# Patient Record
Sex: FEMALE | Race: WHITE | NOT HISPANIC OR LATINO | Employment: FULL TIME | URBAN - METROPOLITAN AREA
[De-identification: names, ages, dates, MRNs, and addresses within clinical notes are randomized per-mention and may not be internally consistent; named-entity substitution may affect disease eponyms.]

---

## 2018-06-21 ENCOUNTER — TRANSCRIBE ORDERS (OUTPATIENT)
Dept: ADMINISTRATIVE | Facility: HOSPITAL | Age: 48
End: 2018-06-21

## 2018-06-21 DIAGNOSIS — R94.31 ABNORMAL RESTING ECG FINDINGS: Primary | ICD-10-CM

## 2018-06-22 ENCOUNTER — HOSPITAL ENCOUNTER (OUTPATIENT)
Dept: CARDIOLOGY | Facility: HOSPITAL | Age: 48
Discharge: HOME | End: 2018-06-22
Attending: FAMILY MEDICINE | Admitting: FAMILY MEDICINE

## 2018-06-22 VITALS — WEIGHT: 145 LBS | HEIGHT: 68 IN | BODY MASS INDEX: 21.98 KG/M2

## 2018-06-22 DIAGNOSIS — R94.31 ABNORMAL RESTING ECG FINDINGS: ICD-10-CM

## 2018-06-22 LAB
BH CV ECHO MEAS - AO MAX PG (FULL): -0.35 MMHG
BH CV ECHO MEAS - AO MAX PG: 8 MMHG
BH CV ECHO MEAS - AO MEAN PG (FULL): 0.27 MMHG
BH CV ECHO MEAS - AO MEAN PG: 4.1 MMHG
BH CV ECHO MEAS - AO V2 MAX: 137.7 CM/SEC
BH CV ECHO MEAS - AO V2 MEAN: 93.7 CM/SEC
BH CV ECHO MEAS - AO V2 VTI: 29 CM
BH CV ECHO MEAS - AVA(I,A): 3 CM^2
BH CV ECHO MEAS - AVA(I,D): 3 CM^2
BH CV ECHO MEAS - AVA(V,A): 3.2 CM^2
BH CV ECHO MEAS - AVA(V,D): 3.2 CM^2
BH CV ECHO MEAS - BSA(HAYCOCK): 1.8 M^2
BH CV ECHO MEAS - BSA: 1.8 M^2
BH CV ECHO MEAS - BZI_BMI: 22 KILOGRAMS/M^2
BH CV ECHO MEAS - BZI_METRIC_HEIGHT: 172.7 CM
BH CV ECHO MEAS - BZI_METRIC_WEIGHT: 65.8 KG
BH CV ECHO MEAS - CONTRAST EF (2CH): 70.6 ML/M^2
BH CV ECHO MEAS - CONTRAST EF 4CH: 57.2 ML/M^2
BH CV ECHO MEAS - EDV(CUBED): 29.3 ML
BH CV ECHO MEAS - EDV(MOD-SP2): 98.8 ML
BH CV ECHO MEAS - EDV(MOD-SP4): 96.9 ML
BH CV ECHO MEAS - EF(MOD-BP): 62.4 %
BH CV ECHO MEAS - EF(MOD-SP2): 70.6 %
BH CV ECHO MEAS - EF(MOD-SP4): 57.2 %
BH CV ECHO MEAS - ESV(CUBED): 120.6 ML
BH CV ECHO MEAS - ESV(MOD-SP2): 29 ML
BH CV ECHO MEAS - ESV(MOD-SP4): 41.5 ML
BH CV ECHO MEAS - FS: 45 %
BH CV ECHO MEAS - IVSD: 0.8 CM
BH CV ECHO MEAS - LAT PEAK E' VEL: 23.1 CM/SEC
BH CV ECHO MEAS - LV DIASTOLIC VOL/BSA (35-75): 54.4 ML/M^2
BH CV ECHO MEAS - LV MASS(C)D: 137.3 GRAMS
BH CV ECHO MEAS - LV MAX PG: 8.3 MMHG
BH CV ECHO MEAS - LV MEAN PG: 3.8 MMHG
BH CV ECHO MEAS - LV SYSTOLIC VOL/BSA (12-30): 23.3 ML/M^2
BH CV ECHO MEAS - LV V1 MAX: 144.4 CM/SEC
BH CV ECHO MEAS - LV V1 MEAN: 87.9 CM/SEC
BH CV ECHO MEAS - LV V1 VTI: 27.8 CM
BH CV ECHO MEAS - LVIDD: 5 CM
BH CV ECHO MEAS - LVIDS: 2.8 CM
BH CV ECHO MEAS - LVLD AP2: 7 CM
BH CV ECHO MEAS - LVLD AP4: 7 CM
BH CV ECHO MEAS - LVLS AP2: 5 CM
BH CV ECHO MEAS - LVLS AP4: 5.6 CM
BH CV ECHO MEAS - LVOT AREA (M): 3.1 CM^2
BH CV ECHO MEAS - LVOT AREA: 3.1 CM^2
BH CV ECHO MEAS - LVOT DIAM: 2 CM
BH CV ECHO MEAS - LVPWD: 0.8 CM
BH CV ECHO MEAS - MED PEAK E' VEL: 13.3 CM/SEC
BH CV ECHO MEAS - MV A MAX VEL: 47.6 CM/SEC
BH CV ECHO MEAS - MV DEC TIME: 0.29 SEC
BH CV ECHO MEAS - MV E MAX VEL: 74.9 CM/SEC
BH CV ECHO MEAS - MV E/A: 1.6
BH CV ECHO MEAS - MV MAX PG: 2.8 MMHG
BH CV ECHO MEAS - MV MEAN PG: 0.95 MMHG
BH CV ECHO MEAS - MV V2 MAX: 83.3 CM/SEC
BH CV ECHO MEAS - MV V2 MEAN: 44.7 CM/SEC
BH CV ECHO MEAS - MV V2 VTI: 28.5 CM
BH CV ECHO MEAS - MVA(VTI): 3 CM^2
BH CV ECHO MEAS - PA ACC TIME: 0.23 SEC
BH CV ECHO MEAS - PA MAX PG: 4.8 MMHG
BH CV ECHO MEAS - PA PR(ACCEL): -25.5 MMHG
BH CV ECHO MEAS - PA V2 MAX: 109.7 CM/SEC
BH CV ECHO MEAS - PI END-D VEL: 97.3 CM/SEC
BH CV ECHO MEAS - PULM A REVS VEL: 28.5 CM/SEC
BH CV ECHO MEAS - PULM DIAS VEL: 68.7 CM/SEC
BH CV ECHO MEAS - PULM S/D: 1
BH CV ECHO MEAS - PULM SYS VEL: 70.7 CM/SEC
BH CV ECHO MEAS - RAP SYSTOLE: 3 MMHG
BH CV ECHO MEAS - RVSP: 24 MMHG
BH CV ECHO MEAS - SI(LVOT): 48.3 ML/M^2
BH CV ECHO MEAS - SI(MOD-SP2): 39.2 ML/M^2
BH CV ECHO MEAS - SI(MOD-SP4): 31.1 ML/M^2
BH CV ECHO MEAS - SV(LVOT): 86.1 ML
BH CV ECHO MEAS - SV(MOD-SP2): 69.8 ML
BH CV ECHO MEAS - SV(MOD-SP4): 55.4 ML
BH CV ECHO MEAS - TAPSE (>1.6): 2.68 CM2
BH CV ECHO MEAS - TR MAX VEL: 228.7 CM/SEC
BH CV ECHO MEASUREMENTS AVERAGE E/E' RATIO: 4.12
BH CV VAS BP LEFT ARM: NORMAL MMHG
BH CV XLRA - RV BASE: 3.7 CM
BH CV XLRA - RV LENGTH: 6.2 CM
BH CV XLRA - RV MID: 3.2 CM
BH CV XLRA - TDI S': 14.2 CM/SEC
LEFT ATRIUM VOLUME INDEX: 25.3 ML/M2
LV EF 2D ECHO EST: 60 %

## 2018-06-22 PROCEDURE — 93306 TTE W/DOPPLER COMPLETE: CPT

## 2018-06-22 PROCEDURE — 93306 TTE W/DOPPLER COMPLETE: CPT | Performed by: INTERNAL MEDICINE

## 2021-05-31 PROBLEM — Z80.3 FAMILY HISTORY OF BREAST CANCER: Status: ACTIVE | Noted: 2021-05-31

## 2021-05-31 PROBLEM — N60.19 FIBROCYSTIC BREAST DISEASE (FCBD): Status: ACTIVE | Noted: 2021-05-31

## 2021-06-01 ENCOUNTER — OFFICE VISIT (OUTPATIENT)
Dept: OBSTETRICS AND GYNECOLOGY | Facility: CLINIC | Age: 51
End: 2021-06-01

## 2021-06-01 VITALS
HEIGHT: 69 IN | BODY MASS INDEX: 22.36 KG/M2 | SYSTOLIC BLOOD PRESSURE: 126 MMHG | DIASTOLIC BLOOD PRESSURE: 80 MMHG | WEIGHT: 151 LBS

## 2021-06-01 DIAGNOSIS — Z01.419 WOMEN'S ANNUAL ROUTINE GYNECOLOGICAL EXAMINATION: Primary | ICD-10-CM

## 2021-06-01 DIAGNOSIS — Z80.3 FAMILY HISTORY OF BREAST CANCER: ICD-10-CM

## 2021-06-01 DIAGNOSIS — N60.19 FIBROCYSTIC BREAST DISEASE (FCBD), UNSPECIFIED LATERALITY: ICD-10-CM

## 2021-06-01 DIAGNOSIS — Z12.31 BREAST CANCER SCREENING BY MAMMOGRAM: ICD-10-CM

## 2021-06-01 PROCEDURE — 99396 PREV VISIT EST AGE 40-64: CPT | Performed by: OBSTETRICS & GYNECOLOGY

## 2021-06-01 RX ORDER — LORATADINE 10 MG/1
10 TABLET ORAL DAILY
COMMUNITY

## 2021-06-01 RX ORDER — MULTIPLE VITAMINS W/ MINERALS TAB 9MG-400MCG
1 TAB ORAL DAILY
COMMUNITY

## 2021-06-01 NOTE — PROGRESS NOTES
GYN Annual Exam     CC - Here for annual exam.        HPI  Merissa Banks is a 50 y.o. female, , who presents for annual well woman exam.  She is perimenopausal.  Periods are irregular, she reports common for her to skip months.  Dysmenorrhea:moderate, occurring first 1-2 days of flow.  Patient reports problems with: dyspareunia. That occurs with initial and deep pentration. Reports it feels like something is scratching the side. Also reports intermittent night sweats. States it is currently really bothersome. There were no changes to her medical or surgical history since her last visit.. Partner Status: Marital Status: .  New Partners since last visit: no.      Additional OB/GYN History   Current contraception: contraceptive methods: Vasectomy   Desires to: do not start contraception  On HRT? No  Last Pap :   Last Completed Pap Smear     This patient has no relevant Health Maintenance data.        History of abnormal Pap smear: yes - many years ago  Family history of uterine, colon, breast, or ovarian cancer: yes - mother and maternal grandmother had breast cancer.  Performs monthly Self-Breast Exam: no  Last mammogram:   Last Completed Mammogram          Ordered - MAMMOGRAM (Every 2 Years) Ordered on 2020  Done - benign @ [facility]              Last colonoscopy: summer of last year with [facility]  Last Completed Colonoscopy     This patient has no relevant Health Maintenance data.        Last DEXA: None  Exercises Regularly: yes  Feelings of Anxiety or Depression: no      Tobacco Usage?: No   OB History        0    Para   0    Term   0       0    AB   0    Living   0       SAB   0    TAB   0    Ectopic   0    Molar   0    Multiple   0    Live Births   0                Health Maintenance   Topic Date Due   • Annual Gynecologic Pelvic and Breast Exam  Never done   • ANNUAL PHYSICAL  Never done   • ZOSTER VACCINE (1 of 2) Never done   • HEPATITIS C SCREENING  Never done   •  "INFLUENZA VACCINE  08/01/2021   • MAMMOGRAM  08/04/2022   • TDAP/TD VACCINES (3 - Td or Tdap) 06/11/2030   • COLORECTAL CANCER SCREENING  08/01/2030   • COVID-19 Vaccine  Completed   • Pneumococcal Vaccine 0-64  Aged Out       The additional following portions of the patient's history were reviewed and updated as appropriate: allergies, current medications, past family history, past medical history, past social history, past surgical history and problem list.    Review of Systems   Constitutional: Negative.    HENT: Negative.    Eyes: Negative.    Respiratory: Negative.    Cardiovascular: Negative.    Gastrointestinal: Negative.    Endocrine: Negative.    Genitourinary: Negative.    Musculoskeletal: Negative.    Skin: Negative.    Allergic/Immunologic: Negative.    Neurological: Negative.    Hematological: Negative.    Psychiatric/Behavioral: Negative.        I have reviewed and agree with the HPI, ROS, and historical information as entered above. Marcia Garrido MD    Objective   /80   Ht 175.3 cm (69\")   Wt 68.5 kg (151 lb)   LMP 05/03/2021   BMI 22.30 kg/m²     Physical Exam  Vitals and nursing note reviewed. Exam conducted with a chaperone present.   Constitutional:       Appearance: She is well-developed.   HENT:      Head: Normocephalic and atraumatic.   Neck:      Thyroid: No thyroid mass or thyromegaly.   Cardiovascular:      Rate and Rhythm: Normal rate and regular rhythm.      Heart sounds: No murmur heard.     Pulmonary:      Effort: Pulmonary effort is normal. No retractions.      Breath sounds: Normal breath sounds. No wheezing, rhonchi or rales.   Chest:      Chest wall: No mass or tenderness.      Breasts:         Right: Normal. No mass, nipple discharge, skin change or tenderness.         Left: Normal. No mass, nipple discharge, skin change or tenderness.   Abdominal:      General: Bowel sounds are normal.      Palpations: Abdomen is soft. Abdomen is not rigid. There is no mass.      " Tenderness: There is no abdominal tenderness. There is no guarding.      Hernia: No hernia is present. There is no hernia in the left inguinal area or right inguinal area.   Genitourinary:     General: Normal vulva.      Exam position: Lithotomy position.      Pubic Area: No rash.       Labia:         Right: No rash, tenderness or lesion.         Left: No rash, tenderness or lesion.       Urethra: No urethral pain or urethral swelling.      Vagina: Normal. No vaginal discharge or lesions.      Cervix: No cervical motion tenderness, discharge, lesion or cervical bleeding.      Uterus: Normal. Not enlarged, not fixed and not tender.       Adnexa:         Right: No mass, tenderness or fullness.          Left: No mass, tenderness or fullness.        Rectum: No external hemorrhoid.   Musculoskeletal:      Cervical back: Normal range of motion. No muscular tenderness.   Neurological:      Mental Status: She is alert and oriented to person, place, and time.   Psychiatric:         Behavior: Behavior normal.            Assessment and Plan    Problem List Items Addressed This Visit        Family History    Family history of breast cancer    Overview     Diagnosed in patient's mother in her 50s.  Maternal grandmother  of breast cancer.         Relevant Orders    Mammo Screening Digital Tomosynthesis Bilateral With CAD       Genitourinary and Reproductive     Fibrocystic breast disease (FCBD)      Other Visit Diagnoses     Women's annual routine gynecological examination    -  Primary    Relevant Orders    Pap IG, HPV-hr    Breast cancer screening by mammogram        Relevant Orders    Mammo Screening Digital Tomosynthesis Bilateral With CAD        Hernia-we discussed lubrication to help.  We also discussed behavioral changes.  And position changes.  Patient to let us know if it is not improved with these changes and we will get pelvic floor physical therapy.  1. GYN annual well woman exam.   2. Reviewed monthly self breast  exams.  Instructed to call with lumps, pain, or breast discharge.  Yearly mammograms ordered.  3. Ordered mammogram today.  4. Recommended use of Vitamin D and getting adequate calcium in her diet. (1500mg)  5. Symptoms of menopausal transition reviewed with patient.   6. Reccommended Flu Vaccine in Fall of each year.  7. RTC in 1 year or PRN with problems.    Marcia Garrido MD  06/01/2021

## 2021-06-08 DIAGNOSIS — Z01.419 WOMEN'S ANNUAL ROUTINE GYNECOLOGICAL EXAMINATION: ICD-10-CM

## 2021-07-17 PROCEDURE — U0004 COV-19 TEST NON-CDC HGH THRU: HCPCS | Performed by: NURSE PRACTITIONER

## 2021-07-18 ENCOUNTER — TELEPHONE (OUTPATIENT)
Dept: URGENT CARE | Facility: CLINIC | Age: 51
End: 2021-07-18

## 2021-07-18 NOTE — TELEPHONE ENCOUNTER
"Patient notified of positive COVID-19 test results.  She states \"I feel good\" except for minor fatigue.  She was advised to remain in self-isolation until further instructed by the health department.  Continue plan of care as discussed during urgent care visit.  She verbalized understanding agreement to the discussion.  "

## 2022-07-18 ENCOUNTER — OFFICE VISIT (OUTPATIENT)
Dept: OBSTETRICS AND GYNECOLOGY | Facility: CLINIC | Age: 52
End: 2022-07-18

## 2022-07-18 VITALS
BODY MASS INDEX: 22.25 KG/M2 | SYSTOLIC BLOOD PRESSURE: 120 MMHG | DIASTOLIC BLOOD PRESSURE: 70 MMHG | HEIGHT: 69 IN | WEIGHT: 150.2 LBS

## 2022-07-18 DIAGNOSIS — Z01.419 WOMEN'S ANNUAL ROUTINE GYNECOLOGICAL EXAMINATION: ICD-10-CM

## 2022-07-18 DIAGNOSIS — Z80.3 FAMILY HISTORY OF BREAST CANCER: Primary | ICD-10-CM

## 2022-07-18 DIAGNOSIS — Z12.31 BREAST CANCER SCREENING BY MAMMOGRAM: ICD-10-CM

## 2022-07-18 DIAGNOSIS — Z78.0 MENOPAUSE: ICD-10-CM

## 2022-07-18 PROCEDURE — 99396 PREV VISIT EST AGE 40-64: CPT | Performed by: OBSTETRICS & GYNECOLOGY

## 2022-07-18 NOTE — PROGRESS NOTES
Gynecologic Annual Exam Note        GYN Annual Exam     CC - Here for annual exam.        HPI  Merissa Banks is a 52 y.o. female, , who presents for annual well woman exam as a established patient.  She is postmenopausal. Denies vaginal bleeding.  Patient reports problems with: denies. There were no changes to her medical or surgical history since her last visit.. Partner Status: Marital Status: .  She is is sexually active. She has not had new partners.. STD testing recommendations have been explained to the patient and she does not desire STD testing.    Additional OB/GYN History   On HRT? No    Last Pap : 2021. Results: negative. HPV: negative  Last Completed Pap Smear          PAP SMEAR (Every 3 Years) Next due on 2021  SCANNED - PAP SMEAR    2020  Done - negative              History of abnormal Pap smear: yes - many years ago   Family history of uterine, colon, breast, or ovarian cancer: yes - MotherPGM- Breast cancer   Performs monthly Self-Breast Exam: no  Last mammogram: . Done at [facility] [place] .    Last Completed Mammogram          Ordered - MAMMOGRAM (Every 2 Years) Ordered on 2020  Done - benign @ [facility]              Last colonoscopy: has had a colonoscopy 2 year(s) ago.    Last Completed Colonoscopy          COLORECTAL CANCER SCREENING (COLONOSCOPY - Every 10 Years) Next due on 2020  COLONOSCOPY (Done - with [facility] in the Summer of 2020)                  Last bone density scan (DEXA): None  Exercises Regularly: yes  Feelings of Anxiety or Depression: no      Tobacco Usage?: No       Current Outpatient Medications:   •  loratadine (CLARITIN) 10 MG tablet, Take 10 mg by mouth Daily. Unknown name, Disp: , Rfl:   •  multivitamin with minerals tablet tablet, Take 1 tablet by mouth Daily., Disp: , Rfl:   •  tretinoin (RETIN-A) 0.05 % cream, tretinoin 0.05 % topical cream  Every night at bedtime for acne, Disp: , Rfl:     Patient  "denies the need for medication refills today.    OB History        0    Para   0    Term   0       0    AB   0    Living   0       SAB   0    IAB   0    Ectopic   0    Molar   0    Multiple   0    Live Births   0                Past Medical History:   Diagnosis Date   • Abnormal Pap smear of cervix    • Fibrocystic breast    • Fibroid         Past Surgical History:   Procedure Laterality Date   • APPENDECTOMY     • CERVIX LESION DESTRUCTION     • CHOLECYSTECTOMY     • COLONOSCOPY     • COLPOSCOPY         Health Maintenance   Topic Date Due   • ANNUAL PHYSICAL  Never done   • ZOSTER VACCINE (1 of 2) Never done   • HEPATITIS C SCREENING  Never done   • COVID-19 Vaccine (4 - Booster for Pfizer series) 2022   • Annual Gynecologic Pelvic and Breast Exam  2022   • MAMMOGRAM  2022   • INFLUENZA VACCINE  10/01/2022   • PAP SMEAR  2024   • COLORECTAL CANCER SCREENING  2030   • TDAP/TD VACCINES (4 - Td or Tdap) 2032   • Pneumococcal Vaccine 0-64  Aged Out       The additional following portions of the patient's history were reviewed and updated as appropriate: allergies, current medications, past family history, past medical history, past social history, past surgical history and problem list.    Review of Systems   Constitutional: Negative.    HENT: Negative.    Eyes: Negative.    Respiratory: Negative.    Cardiovascular: Negative.    Gastrointestinal: Negative.    Endocrine: Negative.    Genitourinary: Negative.    Musculoskeletal: Negative.    Skin: Negative.    Allergic/Immunologic: Negative.    Neurological: Negative.    Hematological: Negative.    Psychiatric/Behavioral: Negative.        I have reviewed and agree with the HPI, ROS, and historical information as entered above. Marcia Garrido MD    Objective   /70   Ht 175.3 cm (69\")   Wt 68.1 kg (150 lb 3.2 oz)   LMP 2021 (Approximate)   BMI 22.18 kg/m²     Physical Exam  Vitals and nursing note " reviewed. Exam conducted with a chaperone present.   Constitutional:       Appearance: She is well-developed.   HENT:      Head: Normocephalic and atraumatic.   Neck:      Thyroid: No thyroid mass or thyromegaly.   Cardiovascular:      Rate and Rhythm: Normal rate and regular rhythm.      Heart sounds: No murmur heard.  Pulmonary:      Effort: Pulmonary effort is normal. No retractions.      Breath sounds: Normal breath sounds. No wheezing, rhonchi or rales.   Chest:      Chest wall: No mass or tenderness.   Breasts:      Right: Normal. No mass, nipple discharge, skin change or tenderness.      Left: Normal. No mass, nipple discharge, skin change or tenderness.        Comments: Bilateral fibrocystic breast tissue  Abdominal:      General: Bowel sounds are normal.      Palpations: Abdomen is soft. Abdomen is not rigid. There is no mass.      Tenderness: There is no abdominal tenderness. There is no guarding.      Hernia: No hernia is present. There is no hernia in the left inguinal area or right inguinal area.   Genitourinary:     General: Normal vulva.      Exam position: Lithotomy position.      Pubic Area: No rash.       Labia:         Right: No rash, tenderness or lesion.         Left: No rash, tenderness or lesion.       Urethra: No urethral pain or urethral swelling.      Vagina: Normal. No vaginal discharge or lesions.      Cervix: No cervical motion tenderness, discharge, lesion or cervical bleeding.      Uterus: Normal. Not enlarged, not fixed and not tender.       Adnexa:         Right: No mass, tenderness or fullness.          Left: No mass, tenderness or fullness.        Rectum: No external hemorrhoid.   Musculoskeletal:      Cervical back: Normal range of motion. No muscular tenderness.   Neurological:      Mental Status: She is alert and oriented to person, place, and time.   Psychiatric:         Behavior: Behavior normal.            Assessment and Plan    Problem List Items Addressed This Visit         Family History    Family history of breast cancer - Primary    Overview     Diagnosed in patient's mother in her 50s.  Maternal grandmother  of breast cancer.           Relevant Orders    Mammo Screening Digital Tomosynthesis Bilateral With CAD      Other Visit Diagnoses     Breast cancer screening by mammogram        Relevant Orders    Mammo Screening Digital Tomosynthesis Bilateral With CAD    Menopause        Relevant Orders    DEXA Bone Density Axial    Women's annual routine gynecological examination              GYN annual well woman exam.   Reviewed monthly self breast exams.  Instructed to call with lumps, pain, or breast discharge.  Yearly mammograms ordered.  Ordered mammogram today.  Recommended use of Vitamin D and getting adequate calcium in her diet. (1500mg)  Osteoporosis screening ordered today.  Reviewed exercise as a preventative health measures.   Reccommended Flu Vaccine in Fall of each year.  RTC in 1 year or PRN with problems.  Return in about 1 year (around 2023) for Annual physical, BMD.     Marcia Garrido MD  2022

## 2023-07-19 NOTE — PROGRESS NOTES
Gynecologic Annual Exam Note        GYN Annual Exam     CC - Here for annual exam.        HPI  Merissa Banks is a 53 y.o. female, , who presents for annual well woman exam as a established patient.  She is postmenopausal. Denies vaginal bleeding.  Patient reports problems with: hot flashes, night sweats, and vaginal dryness. Also c/o of hair loss. There were no changes to her medical or surgical history since her last visit.. Partner Status: Marital Status: .  She is is sexually active. She has not had new partners.. STD testing recommendations have been explained to the patient and she does not desire STD testing.    Additional OB/GYN History   On HRT? No    Last Pap : 21. Results: negative. HPV: negative.   Last Completed Pap Smear            PAP SMEAR (Every 3 Years) Next due on 2021  SCANNED - PAP SMEAR    2020  Done - negative                  History of abnormal Pap smear: yes - 10+ years ago  Family history of uterine, colon, breast, or ovarian cancer: yes - mother and MGM  had breast cancer  Performs monthly Self-Breast Exam: no  Last mammogram: 22. Done at [facility] [place].    Last Completed Mammogram            Ordered - MAMMOGRAM (Every 2 Years) Ordered on 2022  MAMMO SCREENING DIGITAL TOMOSYNTHESIS BILATERAL W CAD    2021  MAMMO SCREENING DIGITAL TOMOSYNTHESIS BILATERAL W CAD    2020  Done - benign @ [facility]    2020  MAMMO SCREENING DIGITAL TOMOSYNTHESIS BILATERAL W CAD    2019  MAMMO SCREENING DIGITAL TOMOSYNTHESIS BILATERAL W CAD                  Last colonoscopy: has had a colonoscopy 3 year(s) ago.    Last Completed Colonoscopy            COLORECTAL CANCER SCREENING (COLONOSCOPY - Every 10 Years) Next due on 2020  COLONOSCOPY (Done - with [facility] in the Summer of )                      Last bone density scan (DEXA): On today and results were Osteopenia  Exercises Regularly: yes  Feelings of  Anxiety or Depression: no      Tobacco Usage?: No       Current Outpatient Medications:   •  cholecalciferol (VITAMIN D3) 25 MCG (1000 UT) tablet, , Disp: , Rfl:   •  loratadine (CLARITIN) 10 MG tablet, Take 1 tablet by mouth Daily. Unknown name, Disp: , Rfl:   •  multivitamin with minerals tablet tablet, Take 1 tablet by mouth Daily., Disp: , Rfl:   •  Omega-3 Fatty Acids (fish oil) 1000 MG capsule capsule, , Disp: , Rfl:   •  tretinoin (RETIN-A) 0.05 % cream, tretinoin 0.05 % topical cream  Every night at bedtime for acne, Disp: , Rfl:     Patient denies the need for medication refills today.    OB History          0    Para   0    Term   0       0    AB   0    Living   0         SAB   0    IAB   0    Ectopic   0    Molar   0    Multiple   0    Live Births   0                Past Medical History:   Diagnosis Date   • Abnormal Pap smear of cervix    • Fibrocystic breast    • Fibroid    • Hepatitis B I think it was B?   • Infectious viral hepatitis Had viral kind as a teenager   • PMS (premenstrual syndrome)    • Urinary tract infection Not recently   • Varicella when i was a child        Past Surgical History:   Procedure Laterality Date   • APPENDECTOMY     • CERVIX LESION DESTRUCTION     • CHOLECYSTECTOMY     • COLONOSCOPY     • COLPOSCOPY     • LAPAROSCOPIC CHOLECYSTECTOMY     • WISDOM TOOTH EXTRACTION         Health Maintenance   Topic Date Due   • ZOSTER VACCINE (1 of 2) Never done   • HEPATITIS C SCREENING  Never done   • ANNUAL PHYSICAL  Never done   • COVID-19 Vaccine (4 - Pfizer series) 2022   • INFLUENZA VACCINE  10/01/2023   • PAP SMEAR  2024   • Annual Gynecologic Pelvic and Breast Exam  2024   • MAMMOGRAM  2024   • DXA SCAN  2025   • COLORECTAL CANCER SCREENING  2030   • TDAP/TD VACCINES (4 - Td or Tdap) 2032   • Pneumococcal Vaccine 0-64  Aged Out       The additional following portions of the patient's history were reviewed and updated as  "appropriate: allergies, current medications, past family history, past medical history, past social history, past surgical history, and problem list.    Review of Systems   All other systems reviewed and are negative.    I have reviewed and agree with the HPI, ROS, and historical information as entered above. Marcia Garrido MD      Objective   /74   Ht 175.3 cm (69.02\")   Wt 68.6 kg (151 lb 3.2 oz)   LMP 05/30/2021 (Approximate)   BMI 22.32 kg/m²     Physical Exam  Vitals and nursing note reviewed. Exam conducted with a chaperone present.   Constitutional:       Appearance: She is well-developed.   HENT:      Head: Normocephalic and atraumatic.   Neck:      Thyroid: No thyroid mass or thyromegaly.   Cardiovascular:      Rate and Rhythm: Normal rate and regular rhythm.      Heart sounds: Murmur heard.   Systolic murmur is present with a grade of 2/6.   Pulmonary:      Effort: Pulmonary effort is normal. No retractions.      Breath sounds: Normal breath sounds. No wheezing, rhonchi or rales.   Chest:      Chest wall: No mass or tenderness.   Breasts:     Right: Normal. No mass, nipple discharge, skin change or tenderness.      Left: Normal. No mass, nipple discharge, skin change or tenderness.      Comments: Bilateral fibrocystic breast tissue noted.  Abdominal:      General: Bowel sounds are normal.      Palpations: Abdomen is soft. Abdomen is not rigid. There is no mass.      Tenderness: There is no abdominal tenderness. There is no guarding.      Hernia: No hernia is present. There is no hernia in the left inguinal area or right inguinal area.   Genitourinary:     General: Normal vulva.      Exam position: Lithotomy position.      Pubic Area: No rash.       Labia:         Right: No rash, tenderness or lesion.         Left: No rash, tenderness or lesion.       Urethra: No urethral pain or urethral swelling.      Vagina: Normal. No vaginal discharge or lesions.      Cervix: No cervical motion " tenderness, discharge, lesion or cervical bleeding.      Uterus: Normal. Not enlarged, not fixed and not tender.       Adnexa:         Right: No mass, tenderness or fullness.          Left: No mass, tenderness or fullness.        Rectum: No external hemorrhoid.   Musculoskeletal:      Cervical back: Normal range of motion. No muscular tenderness.   Neurological:      Mental Status: She is alert and oriented to person, place, and time.   Psychiatric:         Behavior: Behavior normal.          Assessment and Plan    Problem List Items Addressed This Visit          Family History    Family history of breast cancer    Overview     Diagnosed in patient's mother in her 50s.  Maternal grandmother  of breast cancer.  Offered yearly MRI.  Patient unsure if she would like to do that or just mammogram.  We will continue with screening mammogram for now.  Discussed genetic testing on 2023.  Patient to consider and call back if she desires.            Genitourinary and Reproductive     Fibrocystic breast disease (FCBD)     Other Visit Diagnoses       Women's annual routine gynecological examination    -  Primary    Relevant Orders    Mammo Screening Digital Tomosynthesis Bilateral With CAD    Menopause        Relevant Orders    DEXA Bone Density Axial (Completed)    Screening mammogram for breast cancer        Heart murmur                GYN annual well woman exam.   Reviewed monthly self breast exams.  Instructed to call with lumps, pain, or breast discharge.  Yearly mammograms ordered.  Ordered mammogram today.  Recommended use of Vitamin D and getting adequate calcium in her diet. (1500mg)  Osteoporosis screening ordered today.  Reviewed exercise as a preventative health measures.   Symptoms of menopausal transition reviewed with patient.  Reviewed risks/benefits of OTC, non-hormonal and hormonal treatment for vasomotor and other menopausal symptoms.  Recommended Flu Vaccine in Fall of each year.  RTC in 1 year or  PRN with problems.  Other: Patient to follow-up with PCP regarding heart murmur.  She reports that she has had a recent viral illness and is getting off antibiotic.  She is following up with her PCP in 1 month and will does have them evaluate.  We discussed hair loss as a part of the menopausal status.  Patient getting blood work for thyroid disease as well with her PCP next month.  We discussed biotin to help although waiting until her thyroid is drawn as biotin supplementation can affect TSH.  Return in about 1 year (around 7/24/2024) for Annual physical.     Marcia Garrido MD  07/24/2023

## 2023-07-24 ENCOUNTER — OFFICE VISIT (OUTPATIENT)
Dept: OBSTETRICS AND GYNECOLOGY | Facility: CLINIC | Age: 53
End: 2023-07-24
Payer: COMMERCIAL

## 2023-07-24 VITALS
WEIGHT: 151.2 LBS | BODY MASS INDEX: 22.39 KG/M2 | HEIGHT: 69 IN | DIASTOLIC BLOOD PRESSURE: 74 MMHG | SYSTOLIC BLOOD PRESSURE: 110 MMHG

## 2023-07-24 DIAGNOSIS — R01.1 HEART MURMUR: ICD-10-CM

## 2023-07-24 DIAGNOSIS — N60.19 FIBROCYSTIC BREAST DISEASE (FCBD), UNSPECIFIED LATERALITY: ICD-10-CM

## 2023-07-24 DIAGNOSIS — Z78.0 MENOPAUSE: ICD-10-CM

## 2023-07-24 DIAGNOSIS — Z80.3 FAMILY HISTORY OF BREAST CANCER: ICD-10-CM

## 2023-07-24 DIAGNOSIS — Z01.419 WOMEN'S ANNUAL ROUTINE GYNECOLOGICAL EXAMINATION: Primary | ICD-10-CM

## 2023-07-24 DIAGNOSIS — Z12.31 SCREENING MAMMOGRAM FOR BREAST CANCER: ICD-10-CM

## 2023-07-24 PROBLEM — M85.80 OSTEOPENIA, SENILE: Status: ACTIVE | Noted: 2023-07-24

## 2023-07-24 PROCEDURE — 99396 PREV VISIT EST AGE 40-64: CPT | Performed by: OBSTETRICS & GYNECOLOGY

## 2023-07-24 RX ORDER — CHLORAL HYDRATE 500 MG
CAPSULE ORAL
COMMUNITY

## 2023-07-24 RX ORDER — MELATONIN: COMMUNITY

## 2023-09-19 ENCOUNTER — HOSPITAL ENCOUNTER (OUTPATIENT)
Dept: GENERAL RADIOLOGY | Facility: HOSPITAL | Age: 53
Discharge: HOME | End: 2023-09-19
Admitting: FAMILY MEDICINE
Payer: COMMERCIAL

## 2023-09-19 ENCOUNTER — TRANSCRIBE ORDERS (OUTPATIENT)
Dept: ADMINISTRATIVE | Facility: HOSPITAL | Age: 53
End: 2023-09-19
Payer: COMMERCIAL

## 2023-09-19 DIAGNOSIS — M25.552 LEFT HIP PAIN: Primary | ICD-10-CM

## 2023-09-19 DIAGNOSIS — M25.552 LEFT HIP PAIN: ICD-10-CM

## 2023-09-19 PROCEDURE — 73502 X-RAY EXAM HIP UNI 2-3 VIEWS: CPT

## 2023-10-05 ENCOUNTER — OFFICE VISIT (OUTPATIENT)
Dept: ORTHOPEDIC SURGERY | Facility: CLINIC | Age: 53
End: 2023-10-05
Payer: COMMERCIAL

## 2023-10-05 VITALS
HEIGHT: 69 IN | WEIGHT: 151.8 LBS | BODY MASS INDEX: 22.48 KG/M2 | DIASTOLIC BLOOD PRESSURE: 82 MMHG | SYSTOLIC BLOOD PRESSURE: 138 MMHG

## 2023-10-05 DIAGNOSIS — M16.12 PRIMARY OSTEOARTHRITIS OF LEFT HIP: Primary | ICD-10-CM

## 2023-10-05 PROCEDURE — 99204 OFFICE O/P NEW MOD 45 MIN: CPT | Performed by: ORTHOPAEDIC SURGERY

## 2023-10-05 RX ORDER — MELOXICAM 15 MG/1
TABLET ORAL
Qty: 90 TABLET | Refills: 1 | Status: SHIPPED | OUTPATIENT
Start: 2023-10-05

## 2023-10-05 NOTE — PROGRESS NOTES
Orthopaedic Clinic Note: Hip New Patient    Chief Complaint   Patient presents with    Left Hip - Pain        HPI    Merissa Banks is a 53 y.o. female who presents with left hip pain for 1 year(s). Onset atraumatic and gradual in nature. Pain is localized to groin and is a 5/10 on the pain scale.Pain is described as aching. Associated symptoms include pain and giving way/buckling.  The pain is worse with walking and climbing stairs; resting and sitting improve the pain. Previous treatments have included: nothing .. Although some transient relief was reported with these interventions, these conservative measures have failed and symptoms have persisted. The patient is limited in daily activities and has had a significant decrease in quality of life as a result. She denies fevers, chills, or constitutional symptoms.    I have reviewed the following portions of the patient's history:History of Present Illness    Past Medical History:   Diagnosis Date    Abnormal Pap smear of cervix     Fibrocystic breast     Fibroid     Hepatitis B I think it was B?    Hip arthrosis Xray in June 2023    Infectious viral hepatitis Had viral kind as a teenager    PMS (premenstrual syndrome)     Tennis elbow     Urinary tract infection Not recently    Varicella when i was a child      Past Surgical History:   Procedure Laterality Date    APPENDECTOMY      CERVIX LESION DESTRUCTION      CHOLECYSTECTOMY      COLONOSCOPY      COLPOSCOPY      LAPAROSCOPIC CHOLECYSTECTOMY      WISDOM TOOTH EXTRACTION        Family History   Problem Relation Age of Onset    Breast cancer Mother     Cancer Mother         Breast Cancer    Stroke Paternal Grandfather     Breast cancer Maternal Grandmother      Social History     Socioeconomic History    Marital status:    Tobacco Use    Smoking status: Never    Smokeless tobacco: Never   Vaping Use    Vaping Use: Never used   Substance and Sexual Activity    Alcohol use: Not Currently     Comment: light     "Drug use: Never    Sexual activity: Yes     Partners: Male     Birth control/protection: Post-menopausal, None      Current Outpatient Medications on File Prior to Visit   Medication Sig Dispense Refill    cholecalciferol (VITAMIN D3) 25 MCG (1000 UT) tablet       loratadine (CLARITIN) 10 MG tablet Take 1 tablet by mouth Daily. Unknown name      multivitamin with minerals tablet tablet Take 1 tablet by mouth Daily.      Omega-3 Fatty Acids (fish oil) 1000 MG capsule capsule       tretinoin (RETIN-A) 0.05 % cream tretinoin 0.05 % topical cream   Every night at bedtime for acne       No current facility-administered medications on file prior to visit.      No Known Allergies     Review of Systems   Constitutional: Negative.    HENT: Negative.     Eyes: Negative.    Respiratory: Negative.     Cardiovascular: Negative.    Gastrointestinal: Negative.    Endocrine: Negative.    Genitourinary: Negative.    Musculoskeletal:  Positive for arthralgias.   Skin: Negative.    Allergic/Immunologic: Negative.    Neurological: Negative.    Hematological: Negative.    Psychiatric/Behavioral: Negative.        The patient's Review of Systems was personally reviewed and confirmed as accurate.    The following portions of the patient's history were reviewed and updated as appropriate: allergies, current medications, past family history, past medical history, past social history, past surgical history, and problem list.    Physical Exam  Blood pressure 138/82, height 174.6 cm (68.75\"), weight 68.9 kg (151 lb 12.8 oz), last menstrual period 05/30/2021, not currently breastfeeding.    Body mass index is 22.58 kg/m².    GENERAL APPEARANCE: awake, alert & oriented x 3, in no acute distress and well developed, well nourished  PSYCH: normal affect  LUNGS:  breathing nonlabored  EYES: sclera anicteric  CARDIOVASCULAR: palpable dorsalis pedis, palpable posterior tibial bilaterally. Capillary refill less than 2 seconds  EXTREMITIES: no clubbing, " cyanosis  GAIT:  Antalgic           Right Hip Exam:  RANGE OF MOTION:   FLEXION CONTRACTURE: None   FLEXION: 110 degrees   INTERNAL ROTATION: 20 degrees at 90 degrees of flexion   EXTERNAL ROTATION: 40 degrees at 90 degrees of flexion    PAIN WITH HIP MOTION: no  PAIN WITH LOGROLL: no  STINCHFIELD TEST: negative    KNEE EXAM: full knee ROM (0-120 degrees), stable to varus and valgus stress at terminal extension and 30 degrees flexion     STRENGTH:  5/5 hip adduction, abduction, flexion. 5/5 strength knee flexion, extension. 5/5 strength ankle dorsiflexion and plantarflexion.     GREATER TROCHANTER BURSAL PAIN:  no     REFLEXES:   PATELLAR 2+/4   ACHILLES 2+/4    CLONUS: negative  STRAIGHT LEG TEST:   negative    SENSATION TO LIGHT TOUCH:  DEEP PERONEAL/SUPERFICIAL PERONEAL/SURAL/SAPHENOUS/TIBIAL:  intact    EDEMA:   no  ERYTHEMA:  no  WOUNDS/INCISIONS: no overlying skin problems.      Left Hip Exam:   RANGE OF MOTION:   FLEXION CONTRACTURE: None   FLEXION: 110 degrees   INTERNAL ROTATION: 10 degrees at 90 degrees of flexion   EXTERNAL ROTATION: 35 degrees at 90 degrees of flexion    PAIN WITH HIP MOTION: Yes, slight groin pain  PAIN WITH LOGROLL: no  STINCHFIELD TEST: negative    KNEE EXAM: full knee ROM (0-120 degrees), stable to varus and valgus stress at terminal extension and 30 degrees flexion     STRENGTH:  5/5 hip adduction, abduction, flexion. 5/5 strength knee flexion, extension. 5/5 strength ankle dorsiflexion and plantarflexion.     GREATER TROCHANTER BURSAL PAIN:  no     REFLEXES:   PATELLAR 2+/4   ACHILLES 2+/4    CLONUS: negative  STRAIGHT LEG TEST:   negative    SENSATION TO LIGHT TOUCH:  DEEP PERONEAL/SUPERFICIAL PERONEAL/SURAL/SAPHENOUS/TIBIAL:  intact    EDEMA:   no  ERYTHEMA:  no  WOUNDS/INCISIONS: no overlying skin problems.      ------------------------------------------------------------------    LEG LENGTHS:   equal  _____________________________________________________  _____________________________________________________    RADIOGRAPHIC FINDINGS:   AP pelvis and left hip radiographs from 9/19/2023 were personally interpreted.  Radiographs demonstrate mild arthritis of the right hip with preservation of the joint space.  Moderate degenerative changes of the left hip are identified with superior lateral joint space narrowing.  Subchondral cyst in the acetabular roof is also identified.  No acute bony injury or fracture.    Assessment/Plan:   Diagnosis Plan   1. Primary osteoarthritis of left hip  meloxicam (MOBIC) 15 MG tablet        Patient suffering from osteoarthritis of the left hip.  I discussed treatment options with her regarding her ongoing hip pain.  She is agreeable to prescription anti-inflammatory.  Meloxicam prescribed.  If meloxicam fails to adequately improve her pain, I would recommend proceeding to surgical intervention.  I do not recommend intra-articular injection due to the presence of the subchondral cyst.  She will follow-up as needed.    Saul Neumann MD  10/05/23  14:39 EDT

## 2024-07-29 ENCOUNTER — OFFICE VISIT (OUTPATIENT)
Dept: OBSTETRICS AND GYNECOLOGY | Facility: CLINIC | Age: 54
End: 2024-07-29
Payer: COMMERCIAL

## 2024-07-29 VITALS
BODY MASS INDEX: 23.64 KG/M2 | DIASTOLIC BLOOD PRESSURE: 76 MMHG | WEIGHT: 159.6 LBS | SYSTOLIC BLOOD PRESSURE: 122 MMHG | HEIGHT: 69 IN

## 2024-07-29 DIAGNOSIS — N95.1 VAGINAL DRYNESS, MENOPAUSAL: ICD-10-CM

## 2024-07-29 DIAGNOSIS — M85.80 OSTEOPENIA, SENILE: ICD-10-CM

## 2024-07-29 DIAGNOSIS — Z12.31 BREAST CANCER SCREENING BY MAMMOGRAM: ICD-10-CM

## 2024-07-29 DIAGNOSIS — Z01.419 WELL WOMAN EXAM WITH ROUTINE GYNECOLOGICAL EXAM: Primary | ICD-10-CM

## 2024-07-29 PROCEDURE — 99396 PREV VISIT EST AGE 40-64: CPT | Performed by: OBSTETRICS & GYNECOLOGY

## 2024-07-29 PROCEDURE — 99213 OFFICE O/P EST LOW 20 MIN: CPT | Performed by: OBSTETRICS & GYNECOLOGY

## 2024-07-29 RX ORDER — ESTRADIOL 0.1 MG/G
CREAM VAGINAL
Qty: 42.5 G | Refills: 12 | Status: SHIPPED | OUTPATIENT
Start: 2024-07-29

## 2024-07-29 NOTE — PROGRESS NOTES
Gynecologic Annual Exam Note        GYN Annual Exam     CC - Here for annual exam.        HPI  Merissa Banks is a 54 y.o. female, , who presents for annual well woman exam as a established patient.  She is postmenopausal.. Denies vaginal bleeding.   There were no changes to her medical or surgical history since her last visit. Marital Status: .  She is sexually active. She has not had new partners.. STD testing recommendations have been explained to the patient and she declines STD testing.    The patient would like to discuss the following complaints today: Pt reports increased mood swings, muscle aches, vaginal dryness, losing hair, fatigue, hot flashes, night sweats, pt states these symptoms have gotten worse over this past year.     Additional OB/GYN History   On HRT? No    Last Pap : 2021. Results: negative. HPV: negative.   Last Completed Pap Smear       This patient has no relevant Health Maintenance data.          History of abnormal Pap smear: yes - history 10+ years ago, repeat was normal  Family history of uterine, colon, breast, or ovarian cancer: yes - mother and MGM-breast  Performs monthly Self-Breast Exam: no  Last mammogram: 2023. Done at [facility]. There is a copy in the chart.    Last Completed Mammogram            Ordered - MAMMOGRAM (Every 2 Years) Ordered on 2023  SCANNED - MAMMO    2023  MAMMO SCREENING DIGITAL TOMOSYNTHESIS BILATERAL W CAD    2022  MAMMO SCREENING DIGITAL TOMOSYNTHESIS BILATERAL W CAD    2021  MAMMO SCREENING DIGITAL TOMOSYNTHESIS BILATERAL W CAD    2020  [place] [facility] [place] the first 5 history entries have been loaded, but more history exists.                  Last colonoscopy: has had a colonoscopy 4 years ago    Last Completed Colonoscopy            COLORECTAL CANCER SCREENING (COLONOSCOPY - Every 10 Years) Next due on 2020  COLONOSCOPY (Done - with [facility] in the Summer of  )                    Her last bone density scan was 1 year ago and results were Osteopenia  Exercises Regularly: yes  Feelings of Anxiety or Depression: no      Tobacco Usage?: No       Current Outpatient Medications:     cholecalciferol (VITAMIN D3) 25 MCG (1000 UT) tablet, , Disp: , Rfl:     meloxicam (MOBIC) 15 MG tablet, 1 PO Daily with food., Disp: 90 tablet, Rfl: 1    multivitamin with minerals tablet tablet, Take 1 tablet by mouth Daily., Disp: , Rfl:     Omega-3 Fatty Acids (fish oil) 1000 MG capsule capsule, , Disp: , Rfl:     tretinoin (RETIN-A) 0.05 % cream, tretinoin 0.05 % topical cream  Every night at bedtime for acne, Disp: , Rfl:     estradiol (ESTRACE VAGINAL) 0.1 MG/GM vaginal cream, Use a pea-sized amount to the vaginal opening nightly for 2 weeks and then use it 3 times a week for maintenance., Disp: 42.5 g, Rfl: 12    Patient denies the need for medication refills today.    OB History          0    Para   0    Term   0       0    AB   0    Living   0         SAB   0    IAB   0    Ectopic   0    Molar   0    Multiple   0    Live Births   0                Past Medical History:   Diagnosis Date    Abnormal Pap smear of cervix     Fibrocystic breast     Fibroid     Hepatitis B I think it was B?    Hip arthrosis Xray in 2023    Infectious viral hepatitis Had viral kind as a teenager    PMS (premenstrual syndrome)     Tennis elbow     Urinary tract infection Not recently    Varicella when i was a child        Past Surgical History:   Procedure Laterality Date    APPENDECTOMY      CERVIX LESION DESTRUCTION      CHOLECYSTECTOMY      COLONOSCOPY      COLPOSCOPY      LAPAROSCOPIC CHOLECYSTECTOMY      WISDOM TOOTH EXTRACTION         Health Maintenance   Topic Date Due    ZOSTER VACCINE (1 of 2) Never done    HEPATITIS C SCREENING  Never done    ANNUAL PHYSICAL  Never done    COVID-19 Vaccine ( season) 2023    PAP SMEAR  2024    Annual Gynecologic Pelvic and  "Breast Exam  07/25/2024    INFLUENZA VACCINE  08/01/2024    DXA SCAN  07/24/2025    MAMMOGRAM  08/30/2025    COLORECTAL CANCER SCREENING  08/01/2030    TDAP/TD VACCINES (4 - Td or Tdap) 05/31/2032    Pneumococcal Vaccine 0-64  Aged Out       The additional following portions of the patient's history were reviewed and updated as appropriate: allergies, current medications, past family history, past medical history, past social history, past surgical history, and problem list.    Review of Systems   Constitutional:  Positive for diaphoresis and fatigue.        Night sweats  Hot flashes   HENT: Negative.     Eyes: Negative.    Respiratory: Negative.     Cardiovascular: Negative.    Gastrointestinal: Negative.    Endocrine: Negative.    Genitourinary:         Vaginal dryness   Musculoskeletal:  Positive for myalgias.   Skin: Negative.    Allergic/Immunologic: Negative.    Neurological: Negative.    Hematological: Negative.    Psychiatric/Behavioral: Negative.         I have reviewed and agree with the HPI, ROS, and historical information as entered above. Marcia Garrido MD      Objective   /76   Ht 174.6 cm (68.75\")   Wt 72.4 kg (159 lb 9.6 oz)   LMP 05/30/2021 (Approximate)   BMI 23.74 kg/m²     Physical Exam  Vitals and nursing note reviewed. Exam conducted with a chaperone present.   Constitutional:       Appearance: She is well-developed.   HENT:      Head: Normocephalic and atraumatic.   Neck:      Thyroid: No thyroid mass or thyromegaly.   Cardiovascular:      Rate and Rhythm: Normal rate and regular rhythm.      Heart sounds: No murmur heard.  Pulmonary:      Effort: Pulmonary effort is normal. No retractions.      Breath sounds: Normal breath sounds. No wheezing, rhonchi or rales.   Chest:      Chest wall: No mass or tenderness.   Breasts:     Right: Normal. No mass, nipple discharge, skin change or tenderness.      Left: Normal. No mass, nipple discharge, skin change or tenderness.   Abdominal: "      General: Bowel sounds are normal.      Palpations: Abdomen is soft. Abdomen is not rigid. There is no mass.      Tenderness: There is no abdominal tenderness. There is no guarding.      Hernia: No hernia is present. There is no hernia in the left inguinal area or right inguinal area.   Genitourinary:     General: Normal vulva.      Exam position: Lithotomy position.      Pubic Area: No rash.       Labia:         Right: No rash, tenderness or lesion.         Left: No rash, tenderness or lesion.       Urethra: No urethral pain or urethral swelling.      Vagina: Normal. No vaginal discharge or lesions.      Cervix: No cervical motion tenderness, discharge, lesion or cervical bleeding.      Uterus: Normal. Not enlarged, not fixed and not tender.       Adnexa:         Right: No mass, tenderness or fullness.          Left: No mass, tenderness or fullness.        Rectum: No external hemorrhoid.   Musculoskeletal:      Cervical back: Normal range of motion. No muscular tenderness.   Neurological:      Mental Status: She is alert and oriented to person, place, and time.   Psychiatric:         Behavior: Behavior normal.            Assessment and Plan    Problem List Items Addressed This Visit          Genitourinary and Reproductive     Vaginal dryness, menopausal    Overview     7/29/2024-discussed therapy options.  Patient concerned to try systemic HRT.  Will prescribe Estrace         Relevant Medications    estradiol (ESTRACE VAGINAL) 0.1 MG/GM vaginal cream       Musculoskeletal and Injuries    Osteopenia, senile    Overview     7/20/2023-bone density AP spine T score -0.4, femur neck left T score -1.2, femur neck right T score -0.9, femur total left T score -1.5, femur total right T score -0.9.  Recommend calcium, vitamin D, exercise.  Recommend repeating in 2 years.         Relevant Orders    DEXA Bone Density Axial     Other Visit Diagnoses       Well woman exam with routine gynecological exam    -  Primary     Relevant Orders    LIQUID-BASED PAP SMEAR WITH HPV GENOTYPING REGARDLESS OF INTERPRETATION ([facility])    Breast cancer screening by mammogram        Relevant Orders    Mammo Screening Digital Tomosynthesis Bilateral With CAD            GYN annual well woman exam.   Reviewed monthly self breast exams.  Instructed to call with lumps, pain, or breast discharge.  Yearly mammograms ordered.  Ordered mammogram today.  Recommended use of Vitamin D and getting adequate calcium in her diet. (1500mg)  Osteoporosis screening ordered today.  Reviewed exercise as a preventative health measures.   Symptoms of menopausal transition reviewed with patient.  Reviewed risks/benefits of OTC, non-hormonal and hormonal treatment for vasomotor and other menopausal symptoms.  Recommended Flu Vaccine in Fall of each year.  RTC in 1 year or PRN with problems.  Return in about 1 year (around 7/29/2025) for Annual physical.         Marcia Garrido MD  07/29/2024

## 2024-08-05 LAB — REF LAB TEST METHOD: NORMAL

## 2024-08-28 ENCOUNTER — TRANSCRIBE ORDERS (OUTPATIENT)
Dept: GENERAL RADIOLOGY | Facility: HOSPITAL | Age: 54
End: 2024-08-28
Payer: COMMERCIAL

## 2024-08-28 DIAGNOSIS — M51.36 DDD (DEGENERATIVE DISC DISEASE), LUMBAR: Primary | ICD-10-CM

## 2025-08-04 ENCOUNTER — OFFICE VISIT (OUTPATIENT)
Dept: OBSTETRICS AND GYNECOLOGY | Facility: CLINIC | Age: 55
End: 2025-08-04
Payer: COMMERCIAL

## 2025-08-04 VITALS
DIASTOLIC BLOOD PRESSURE: 76 MMHG | WEIGHT: 154.4 LBS | SYSTOLIC BLOOD PRESSURE: 124 MMHG | HEIGHT: 69 IN | BODY MASS INDEX: 22.87 KG/M2

## 2025-08-04 DIAGNOSIS — Z01.419 ROUTINE GYNECOLOGICAL EXAMINATION: ICD-10-CM

## 2025-08-04 DIAGNOSIS — Z13.820 OSTEOPOROSIS SCREENING: ICD-10-CM

## 2025-08-04 DIAGNOSIS — Z12.31 ENCOUNTER FOR SCREENING MAMMOGRAM FOR MALIGNANT NEOPLASM OF BREAST: Primary | ICD-10-CM

## 2025-08-04 DIAGNOSIS — N95.1 VAGINAL DRYNESS, MENOPAUSAL: ICD-10-CM

## 2025-08-04 RX ORDER — ESTRADIOL 0.1 MG/G
CREAM VAGINAL
Qty: 42.5 G | Refills: 12 | Status: SHIPPED | OUTPATIENT
Start: 2025-08-04